# Patient Record
Sex: FEMALE | Race: BLACK OR AFRICAN AMERICAN | NOT HISPANIC OR LATINO | URBAN - METROPOLITAN AREA
[De-identification: names, ages, dates, MRNs, and addresses within clinical notes are randomized per-mention and may not be internally consistent; named-entity substitution may affect disease eponyms.]

---

## 2023-06-05 PROBLEM — Z00.00 ENCOUNTER FOR PREVENTIVE HEALTH EXAMINATION: Status: ACTIVE | Noted: 2023-06-05

## 2023-06-17 ENCOUNTER — EMERGENCY (EMERGENCY)
Facility: HOSPITAL | Age: 38
LOS: 1 days | Discharge: HOME | End: 2023-06-17
Attending: STUDENT IN AN ORGANIZED HEALTH CARE EDUCATION/TRAINING PROGRAM
Payer: COMMERCIAL

## 2023-06-17 VITALS
OXYGEN SATURATION: 99 % | DIASTOLIC BLOOD PRESSURE: 86 MMHG | RESPIRATION RATE: 18 BRPM | SYSTOLIC BLOOD PRESSURE: 121 MMHG | HEART RATE: 84 BPM | TEMPERATURE: 98 F

## 2023-06-17 VITALS
TEMPERATURE: 98 F | WEIGHT: 149.91 LBS | SYSTOLIC BLOOD PRESSURE: 137 MMHG | DIASTOLIC BLOOD PRESSURE: 92 MMHG | HEART RATE: 80 BPM | RESPIRATION RATE: 18 BRPM | OXYGEN SATURATION: 98 %

## 2023-06-17 PROCEDURE — 73090 X-RAY EXAM OF FOREARM: CPT | Mod: 26,RT

## 2023-06-17 PROCEDURE — 73110 X-RAY EXAM OF WRIST: CPT

## 2023-06-17 PROCEDURE — 99284 EMERGENCY DEPT VISIT MOD MDM: CPT

## 2023-06-17 PROCEDURE — 99284 EMERGENCY DEPT VISIT MOD MDM: CPT | Mod: 25

## 2023-06-17 PROCEDURE — 73110 X-RAY EXAM OF WRIST: CPT | Mod: 26,RT

## 2023-06-17 PROCEDURE — 73090 X-RAY EXAM OF FOREARM: CPT

## 2023-06-17 PROCEDURE — 73080 X-RAY EXAM OF ELBOW: CPT

## 2023-06-17 PROCEDURE — 73080 X-RAY EXAM OF ELBOW: CPT | Mod: 26,RT

## 2023-06-17 RX ORDER — ACETAMINOPHEN 500 MG
650 TABLET ORAL ONCE
Refills: 0 | Status: COMPLETED | OUTPATIENT
Start: 2023-06-17 | End: 2023-06-17

## 2023-06-17 RX ORDER — OXYCODONE HYDROCHLORIDE 5 MG/1
5 TABLET ORAL ONCE
Refills: 0 | Status: DISCONTINUED | OUTPATIENT
Start: 2023-06-17 | End: 2023-06-17

## 2023-06-17 RX ADMIN — Medication 650 MILLIGRAM(S): at 22:00

## 2023-06-17 RX ADMIN — OXYCODONE HYDROCHLORIDE 5 MILLIGRAM(S): 5 TABLET ORAL at 21:21

## 2023-06-17 RX ADMIN — OXYCODONE HYDROCHLORIDE 5 MILLIGRAM(S): 5 TABLET ORAL at 22:00

## 2023-06-17 RX ADMIN — Medication 650 MILLIGRAM(S): at 21:21

## 2023-06-17 NOTE — ED ADULT TRIAGE NOTE - CHIEF COMPLAINT QUOTE
while practicing martial arts pt hit R arm on a board and developed pain and swelling  to medial wrist/ forearm. difficulty pronating wrist.

## 2023-06-17 NOTE — ED PROVIDER NOTE - CLINICAL SUMMARY MEDICAL DECISION MAKING FREE TEXT BOX
37-year-old female presenting  with likely right forearm fracture.   neurovascularly intact.  We will get x-rays and pain control.

## 2023-06-17 NOTE — ED PROVIDER NOTE - CARE PROVIDER_API CALL
José Jama  Orthopaedic Surgery  [address], [address]  [place] [address]  Phone: [phone]  Fax: [phone]  Follow Up Time: 7-10 Days

## 2023-06-17 NOTE — ED PROVIDER NOTE - PROGRESS NOTE DETAILS
Shaquille Romo, PGY2 EXTR shows fracture.  Sugar-tong splint was placed.  Will give  for follow-up in a week.

## 2023-06-17 NOTE — ED ADULT NURSE NOTE - NSFALLUNIVINTERV_ED_ALL_ED
Bed/Stretcher in lowest position, wheels locked, appropriate side rails in place/Call bell, personal items and telephone in reach/Instruct patient to call for assistance before getting out of bed/chair/stretcher/Non-slip footwear applied when patient is off stretcher/[place] to call system/Physically safe environment - no spills, clutter or unnecessary equipment/Purposeful proactive rounding/Room/bathroom lighting operational, light cord in reach

## 2023-06-17 NOTE — ED PROVIDER NOTE - ATTENDING CONTRIBUTION TO CARE
I, Glenn Alexander, performed a history and physical exam of the patient and discussed their management with the resident and /or advanced care provider. I reviewed the resident and /or ACP's note and agree with the documented findings and plan of care. I was present and available for all procedures.    37-year-old female with no significant past medical history presents with right forearm pain status post trying to break a wooden board.  Patient was at karate trying to chop down a wooden board and unfortunately was unable to and instantly had pain.  The mid right forearm on the ulnar side.  Patient denies any lacerations to the skin denies any numbness or tingling of the hand or any weakness.  Patient does not take any blood thinning medications and otherwise denies any other additional trauma.  Patient endorses some pain at the elbow when pronating and supinating the hand.    On exam patient with some redness and swelling of the mid to distal ulna without any laceration of the skin but there is abrasion and redness appreciated with tenderness to palpation.  U/R/M nerves are intact, patient able to give an okay sign, peace sign and radial pulses 2+ and symmetric bilaterally.  Patient without any tenderness of the right humerus or elbow.  No hand tenderness to palpation.    Presentation concerning for possible ulnar fracture versus contusion patient ordered for x-ray and pain medication.

## 2023-06-17 NOTE — ED PROVIDER NOTE - PATIENT PORTAL LINK FT
You can access the FollowMyHealth Patient Portal offered by [facility] by registering at the following website: [email or web address]. By joining [facility]’s FollowMyHealth portal, you will also be able to view your health information using other applications (apps) compatible with our system.

## 2023-06-17 NOTE — ED PROVIDER NOTE - OBJECTIVE STATEMENT
37-year-old female no past medical history, no AC or aspirin presents to the ED with right  forearm pain and deformity after she was in a martial arts class and attempted to break a wood with the arm.  Denies any head trauma, no LOC.  Has some tenderness around the elbow as well.

## 2023-06-17 NOTE — ED PROVIDER NOTE - NSFOLLOWUPINSTRUCTIONS_ED_ALL_ED_FT
Please follow-up with Dr. gaspar  In his office in a week.  Please call the phone number that was provided to you to make an appointment.    As discussed, please keep the arm elevated when you sleep or sit down.  You may take 500-1000 mg acetaminophen every 6 hours, as needed for pain  You may take 600 mg ibuprofen every 8 hours, with food, as needed for pain    Please return to the hospital if you experience any new or worsening symptoms including inability to move your fingers, worsening pain in your fingers or your arm, or pale fingers

## 2023-06-17 NOTE — ED ADULT NURSE NOTE - OBJECTIVE STATEMENT
38 yo f c/o right arm pain s/p injury. PT states that she was practicing martial arts and breaking boards with her right arm and began feeling swelling and pain after hitting one today. No pertinent PMH. PT A,Ox4, ambulatory at baseline. Respirations even and unlabored, abd soft, nondistended and nontender, skin warm, dry and intact, unable to move right arm. Denies HA, CP, SOB, n/v/d, fever, chills and urinary symptoms.

## 2023-06-17 NOTE — ED PROVIDER NOTE - PHYSICAL EXAMINATION
Const: Well-nourished, Well-developed, appearing stated age.  Eyes: no conjunctival injection, and symmetrical lids.  HEENT: Head NCAT, no lesions. Atraumatic external nose and ears. Moist MM.  Neck: Symmetric, trachea midline.   RESP: Unlabored respiratory effort.   GI: Nontender/Nondistended, No CVA tenderness b/l.   MSK:   Right forearm deformity   With edema and TTP.  Pulses intact.  Full ROM and strength of of fingers.  TTP to elbow.  Skin: Warm, dry and intact.   Neuro: CNs II-XII grossly intact. Motor & Sensation grossly intact.  Psych: Awake, Alert, & Oriented (AAO) x3. Appropriate mood and affect.

## 2023-06-20 ENCOUNTER — APPOINTMENT (OUTPATIENT)
Dept: ORTHOPEDIC SURGERY | Facility: CLINIC | Age: 38
End: 2023-06-20
Payer: COMMERCIAL

## 2023-06-20 ENCOUNTER — NON-APPOINTMENT (OUTPATIENT)
Age: 38
End: 2023-06-20

## 2023-06-20 PROCEDURE — 73110 X-RAY EXAM OF WRIST: CPT | Mod: RT

## 2023-06-20 PROCEDURE — 29075 APPL CST ELBW FNGR SHORT ARM: CPT | Mod: RT

## 2023-06-20 PROCEDURE — 99203 OFFICE O/P NEW LOW 30 MIN: CPT | Mod: 25

## 2023-06-20 NOTE — END OF VISIT
[FreeTextEntry3] : All medical record entries made by the Scribe were at my,  Dr. José Jama MD., direction and personally dictated by me on 06/20/2023. I have personally reviewed the chart and agree that the record accurately reflects my personal performance of the history, physical exam, assessment and plan.

## 2023-06-20 NOTE — PHYSICAL EXAM
[de-identified] : Patient is WDWN, alert, and in no acute distress. Breathing is unlabored. She is grossly oriented to person, place, and time.\par \par She is accompanied by her  today.\par \par Right Wrist:\par She presents immobilized in a sling and short arm splint, which were removed for exam. \par After the splint was removed, no evidence of skin breakdown or lesions.\par There is moderate edema without ecchymosis to the wrist dorsally.\par Tenderness at the distal ulna, at the level of the fracture.\par ROM to the wrist was not accessed.\par Digital motion is full.\par Sensation is intact along the median nerve distribution, with slightly diminished sensation at the little finger, along the ulnar nerve distribution.  [de-identified] : AP, lateral and oblique views of the RIGHT wrist were obtained today and revealed an extra-articular distal ulnar fracture with mild displacement. \par \par ------------------------------------------------------------------------------------------------------------------------------------------------------------------------------------ \par \par EXAM: XR FOREARM 2 VIEWS RT\par EXAM: XR WRIST COMP MIN 3 VIEWS RT\par PROCEDURE DATE: 06/17/2023\par IMPRESSION:\par Acute extra-articular distal right ulnar fracture with mild anterior displacement of distal fracture fragment. No dislocation of the wrist. Moderate soft tissue swelling about the distal right forearm/wrist.\par \par MEHREEN LOVE MD; Resident Radiologist\par This document has been electronically signed.\par ROSANA KRISHNAN DO; Attending Radiologist\par This document has been electronically signed. Jun 18 2023 10:12AM\par \par ------------------------------------------------------------------------------------------------------------------------------------------------------------------------------------\par \par EXAM: XR ELBOW COMP MIN 3V RT\par PROCEDURE DATE: 06/17/2023\par IMPRESSION: \par No acute fracture or dislocation. No elbow joint effusion.\par \par MEHREEN LOVE MD; Resident Radiologist\par This document has been electronically signed.\par ROSANA KRISHNAN DO; Attending Radiologist\par This document has been electronically signed. Jun 18 2023 10:56AM

## 2023-06-20 NOTE — HISTORY OF PRESENT ILLNESS
[de-identified] : Pt is a RHD 36 y/o female with a right wrist injury x 3 days (DOI: 06/17/2023). She was training for martial arts and tried to perform a "board break." She immediately had pain and was seen at [facility] ED on 6/17/23 where xrays were obtained and revealed a distal ulnar fracture. She was splinted and advised to follow up with a specialist. \par \par She smokes cigarettes and estimates she smokes about half of a pack.

## 2023-06-20 NOTE — DISCUSSION/SUMMARY
[de-identified] : The underlying pathophysiology was reviewed with the patient. XR films were reviewed with the patient. Discussed at length the nature of the patient’s condition. The right wrist symptoms are secondary to a distal ulnar fracture. \par \par At this time, I reviewed her xrays with her in great detail. We discussed treatment options of operative and nonoperative management. I explained to her that while the fracture is not in perfect, it is overall reasonably aligned and unless she were to tell me otherwise, I would recommend treating this nonoperatively in a cast rather than with ORIF, at least initially. She is in agreement and has deferred ORIF in lieu of nonoperative management of cast immobilization.\par \par She was placed into a right short arm cast in the office today on 6/20/23.\par Proper cast care instructions were reviewed.\par I recommended ROM exercises of the digits, wrist, elbow and shoulder while casted.\par She was instructed to use the hand for all ADLs as tolerated to maintain motion and function, while casted.\par NSAIDs prn. \par \par Finally, she was instructed to discontinue smoking, at least while the fracture is healing. I discussed the side effects of Nicotine on bone health and healing in great detail. \par \par All questions answered, understanding verbalized. Patient in agreement with plan of care. Follow up in 2 weeks for repeat xrays in the cast.

## 2023-06-20 NOTE — ADDENDUM
[FreeTextEntry1] : I, Leah Connors wrote this note acting as a scribe for Dr. José Jama on Jun 20, 2023.

## 2023-07-06 ENCOUNTER — APPOINTMENT (OUTPATIENT)
Dept: ORTHOPEDIC SURGERY | Facility: CLINIC | Age: 38
End: 2023-07-06
Payer: COMMERCIAL

## 2023-07-06 PROCEDURE — 99213 OFFICE O/P EST LOW 20 MIN: CPT

## 2023-07-06 PROCEDURE — 73110 X-RAY EXAM OF WRIST: CPT | Mod: RT

## 2023-07-06 NOTE — ADDENDUM
[FreeTextEntry1] : I, Leah Connors wrote this note acting as a scribe for Dr. José Jama on Jul 06, 2023.

## 2023-07-06 NOTE — DISCUSSION/SUMMARY
[de-identified] : The underlying pathophysiology was reviewed with the patient. XR films were reviewed with the patient. Discussed at length the nature of the patient’s condition. The right wrist symptoms are secondary to a distal ulnar fracture. \par \par At this time, she will continue in the cast to complete the full 6 weeks of immobilization. I did tell her there is about a 90 percent chance of healing but I would recommend giving this the full 6 weeks to see if it does in fact heal, prior to possibly recommending ORIF in the event a nonunion results. She was instructed on activity modification. \par \par Finally, she was instructed to discontinue smoking, at least while the fracture is healing. I discussed the side effects of Nicotine on bone health and healing in great detail. \par \par All questions answered, understanding verbalized. Patient in agreement with plan of care. Follow up in 3 weeks for repeat xrays and cast removal.

## 2023-07-06 NOTE — PHYSICAL EXAM
[de-identified] : Patient is WDWN, alert, and in no acute distress. Breathing is unlabored. She is grossly oriented to person, place, and time.\par \par She is accompanied by her  today.\par \par Right Wrist:\par She presents immobilized in a short arm cast.\par The cast is fitting well, without issue.\par Digits are moving freely with brisk capillary refill.\par Sensation to the digits is intact distally.  [de-identified] : AP, lateral and oblique views of the RIGHT wrist were obtained today and revealed an extra-articular distal ulnar fracture with mild displacement. There is 2.8 mm of displacement seen on previous xrays from 6/20/23 as well as on today's xrays. \par \par ------------------------------------------------------------------------------------------------------------------------------------------------------------------------------------ \par \par EXAM: XR FOREARM 2 VIEWS RT\par EXAM: XR WRIST COMP MIN 3 VIEWS RT\par PROCEDURE DATE: 06/17/2023\par IMPRESSION:\par Acute extra-articular distal right ulnar fracture with mild anterior displacement of distal fracture fragment. No dislocation of the wrist. Moderate soft tissue swelling about the distal right forearm/wrist.\par \par MEHREEN LOVE MD; Resident Radiologist\par This document has been electronically signed.\par ROSANA KRISHNAN DO; Attending Radiologist\par This document has been electronically signed. Jun 18 2023 10:12AM\par \par ------------------------------------------------------------------------------------------------------------------------------------------------------------------------------------\par \par EXAM: XR ELBOW COMP MIN 3V RT\par PROCEDURE DATE: 06/17/2023\par IMPRESSION: \par No acute fracture or dislocation. No elbow joint effusion.\par \par MEHREEN LOVE MD; Resident Radiologist\par This document has been electronically signed.\par ROSANA KRISHNAN DO; Attending Radiologist\par This document has been electronically signed. Jun 18 2023 10:56AM

## 2023-07-06 NOTE — HISTORY OF PRESENT ILLNESS
[de-identified] : Pt is a RHD 38 y/o female with a right wrist injury. (DOI: 06/17/2023). She was training for martial arts and tried to perform a "board break." She immediately had pain and was seen at [facility] ED on 6/17/23 where xrays were obtained and revealed a distal ulnar fracture. She was splinted and advised to follow up with a specialist. She was initially seen in the office on 6/20/23 at which time she was casted secondary to the injury as stated above. She returns on 7/6/23 for repeat xrays in the cast. She does still note limitations of motion as well as pain with rotation. She is woken up at night due to the pain. She is complaining of increased pain currently than a few weeks ago as she has been going to [employer] classes with the cast in place and notes she twisted the wrist. \par \par She smokes cigarettes and estimates she smokes about half of a pack.

## 2023-07-06 NOTE — END OF VISIT
[FreeTextEntry3] : All medical record entries made by the Scribe were at my,  Dr. José Jama MD., direction and personally dictated by me on 07/06/2023. I have personally reviewed the chart and agree that the record accurately reflects my personal performance of the history, physical exam, assessment and plan.

## 2023-07-11 ENCOUNTER — APPOINTMENT (OUTPATIENT)
Dept: ORTHOPEDIC SURGERY | Facility: CLINIC | Age: 38
End: 2023-07-11

## 2023-08-03 ENCOUNTER — APPOINTMENT (OUTPATIENT)
Dept: ORTHOPEDIC SURGERY | Facility: CLINIC | Age: 38
End: 2023-08-03
Payer: COMMERCIAL

## 2023-08-03 VITALS — WEIGHT: 150 LBS | BODY MASS INDEX: 24.11 KG/M2 | HEIGHT: 66 IN

## 2023-08-03 PROCEDURE — 29075 APPL CST ELBW FNGR SHORT ARM: CPT | Mod: RT

## 2023-08-03 PROCEDURE — 73110 X-RAY EXAM OF WRIST: CPT | Mod: RT

## 2023-08-03 PROCEDURE — 99213 OFFICE O/P EST LOW 20 MIN: CPT | Mod: 25

## 2023-08-03 NOTE — ADDENDUM
[FreeTextEntry1] : I, Leah Connors wrote this note acting as a scribe for Dr. José Jama on Aug 03, 2023.

## 2023-08-03 NOTE — HISTORY OF PRESENT ILLNESS
[de-identified] : Pt is a RHD 38 y/o female with a right wrist injury. (DOI: 06/17/2023). She was training for martial arts and tried to perform a "board break." She immediately had pain and was seen at [facility] ED on 6/17/23 where xrays were obtained and revealed a distal ulnar fracture. She was splinted and advised to follow up with a specialist. She was initially seen in the office on 6/20/23 at which time she was casted secondary to the injury as stated above. She returns for repeat xrays in the cast on 8/3/23. She reports a great deal of increased pain in the cast at the fracture site, as of the past 1 week. She also states the cast is beginning to split. She still complains of limited supination with associated pain. She smokes cigarettes and estimates she smokes about 1 pack of cigarettes per week.

## 2023-08-03 NOTE — DISCUSSION/SUMMARY
[de-identified] : The underlying pathophysiology was reviewed with the patient. XR films were reviewed with the patient. Discussed at length the nature of the patient's condition. The right wrist symptoms are secondary to a distal ulnar fracture.   At this time, I discussed the xrays findings with her in great detail. I explained to her that at 5 weeks, it is concerning that there is minimal healing as well as calcium resorption noted at the fracture site. Additionally, her notable increase in pain as of the last 1 week is concerning as well. I did tell her that given she is a smoker; this is likely the main contributing factor as to why the fracture did not heal. I did tell her that taking everything into consideration that we discussed, as documented above, I would recommend ORIF. She stated she would like to give things two more weeks immobilized in a cast to see how she progresses and ultimately if at that time, there is a resultant nonunion, she will elect to proceed with ORIF. I did however explain to her that if she continues to smoke, the fracture likely will not heal, even after undergoing surgery. In the interim, she was placed back into a right short arm cast. Proper cast care instructions were reviewed. Finally, she was again instructed to discontinue smoking, at least while the fracture is healing. I discussed the side effects of Nicotine on bone health and healing in great detail.   All questions answered, understanding verbalized. Patient in agreement with plan of care. Follow up in 2 weeks for repeat xrays.

## 2023-08-03 NOTE — PHYSICAL EXAM
[de-identified] : Patient is WDWN, alert, and in no acute distress. Breathing is unlabored. She is grossly oriented to person, place, and time.  She is accompanied by her  today.  Right Wrist: She presents immobilized in a short arm cast, which was removed for exam After the cast was removed, no evidence of skin breakdown or lesions. She has focal tenderness along the distal ulna, in the region of the fracture. Supination of the forearm/wrist is limited with pain. Pronation is full. Digital motion is full.  Sensation to the digits is intact distally.  [de-identified] : AP, lateral and oblique views of the RIGHT wrist were obtained today and revealed an extra-articular distal ulnar fracture with mild displacement. There is increased evidence of displacement, with possible resorption of calcium noted at the fracture site.   ------------------------------------------------------------------------------------------------------------------------------------------------------------------------------------   EXAM: XR FOREARM 2 VIEWS RT EXAM: XR WRIST COMP MIN 3 VIEWS RT PROCEDURE DATE: 06/17/2023 IMPRESSION: Acute extra-articular distal right ulnar fracture with mild anterior displacement of distal fracture fragment. No dislocation of the wrist. Moderate soft tissue swelling about the distal right forearm/wrist.  MEHREEN LOVE MD; Resident Radiologist This document has been electronically signed. ROSANA KRISHNAN DO; Attending Radiologist This document has been electronically signed. Jun 18 2023 10:12AM  ------------------------------------------------------------------------------------------------------------------------------------------------------------------------------------  EXAM: XR ELBOW COMP MIN 3V RT PROCEDURE DATE: 06/17/2023 IMPRESSION:  No acute fracture or dislocation. No elbow joint effusion.  MEHREEN LOVE MD; Resident Radiologist This document has been electronically signed. ROSANA KRISHNAN DO; Attending Radiologist This document has been electronically signed. Jun 18 2023 10:56AM

## 2023-08-03 NOTE — END OF VISIT
[FreeTextEntry3] : All medical record entries made by the Scribe were at my,  Dr. José Jama MD., direction and personally dictated by me on 08/03/2023. I have personally reviewed the chart and agree that the record accurately reflects my personal performance of the history, physical exam, assessment and plan.

## 2023-08-08 ENCOUNTER — APPOINTMENT (OUTPATIENT)
Dept: ORTHOPEDIC SURGERY | Facility: CLINIC | Age: 38
End: 2023-08-08

## 2023-08-17 ENCOUNTER — APPOINTMENT (OUTPATIENT)
Dept: ORTHOPEDIC SURGERY | Facility: CLINIC | Age: 38
End: 2023-08-17
Payer: COMMERCIAL

## 2023-08-17 ENCOUNTER — OUTPATIENT (OUTPATIENT)
Dept: OUTPATIENT SERVICES | Facility: HOSPITAL | Age: 38
LOS: 1 days | End: 2023-08-17
Payer: COMMERCIAL

## 2023-08-17 VITALS
TEMPERATURE: 98 F | RESPIRATION RATE: 14 BRPM | OXYGEN SATURATION: 99 % | DIASTOLIC BLOOD PRESSURE: 80 MMHG | HEART RATE: 79 BPM | SYSTOLIC BLOOD PRESSURE: 130 MMHG | WEIGHT: 166.89 LBS | HEIGHT: 65 IN

## 2023-08-17 VITALS — HEIGHT: 66 IN | WEIGHT: 165 LBS | BODY MASS INDEX: 26.52 KG/M2

## 2023-08-17 DIAGNOSIS — Z01.818 ENCOUNTER FOR OTHER PREPROCEDURAL EXAMINATION: ICD-10-CM

## 2023-08-17 DIAGNOSIS — S52.609A UNSPECIFIED FRACTURE OF LOWER END OF UNSPECIFIED ULNA, INITIAL ENCOUNTER FOR CLOSED FRACTURE: ICD-10-CM

## 2023-08-17 DIAGNOSIS — S52.601A UNSPECIFIED FRACTURE OF LOWER END OF RIGHT ULNA, INITIAL ENCOUNTER FOR CLOSED FRACTURE: ICD-10-CM

## 2023-08-17 LAB
HCG SERPL-ACNC: <1 MIU/ML — SIGNIFICANT CHANGE UP
HCT VFR BLD CALC: 42.8 % — SIGNIFICANT CHANGE UP (ref 34.5–45)
HGB BLD-MCNC: 14 G/DL — SIGNIFICANT CHANGE UP (ref 11.5–15.5)
MCHC RBC-ENTMCNC: 27.6 PG — SIGNIFICANT CHANGE UP (ref 27–34)
MCHC RBC-ENTMCNC: 32.7 GM/DL — SIGNIFICANT CHANGE UP (ref 32–36)
MCV RBC AUTO: 84.4 FL — SIGNIFICANT CHANGE UP (ref 80–100)
NRBC # BLD: 0 /100 WBCS — SIGNIFICANT CHANGE UP (ref 0–0)
PLATELET # BLD AUTO: 309 K/UL — SIGNIFICANT CHANGE UP (ref 150–400)
RBC # BLD: 5.07 M/UL — SIGNIFICANT CHANGE UP (ref 3.8–5.2)
RBC # FLD: 15.5 % — HIGH (ref 10.3–14.5)
WBC # BLD: 8.17 K/UL — SIGNIFICANT CHANGE UP (ref 3.8–10.5)
WBC # FLD AUTO: 8.17 K/UL — SIGNIFICANT CHANGE UP (ref 3.8–10.5)

## 2023-08-17 PROCEDURE — G0463: CPT

## 2023-08-17 PROCEDURE — 85027 COMPLETE CBC AUTOMATED: CPT

## 2023-08-17 PROCEDURE — 99213 OFFICE O/P EST LOW 20 MIN: CPT

## 2023-08-17 PROCEDURE — 84702 CHORIONIC GONADOTROPIN TEST: CPT

## 2023-08-17 PROCEDURE — 36415 COLL VENOUS BLD VENIPUNCTURE: CPT

## 2023-08-17 PROCEDURE — 73110 X-RAY EXAM OF WRIST: CPT | Mod: RT

## 2023-08-17 NOTE — ADDENDUM
[FreeTextEntry1] : I, Leah Connors wrote this note acting as a scribe for Dr. José Jama on Aug 17, 2023.

## 2023-08-17 NOTE — H&P PST ADULT - NSICDXPROCEDURE_GEN_ALL_CORE_FT
PROCEDURES:  Arthroscopy-assisted open reduction and internal fixation (ORIF) of injury of wrist 17-Aug-2023 12:16:16 ORIF right distal ulnar fracture Anum Hernandes

## 2023-08-17 NOTE — PHYSICAL EXAM
[de-identified] : Patient is WDWN, alert, and in no acute distress. Breathing is unlabored. She is grossly oriented to person, place, and time.  She is accompanied by her  today.  Right Wrist: She presents immobilized in a short arm cast, which was removed for exam After the cast was removed, no evidence of skin breakdown or lesions. She has focal tenderness along the distal ulna, in the region of the fracture. Supination of the forearm/wrist is limited with pain. Pronation is full. Digital motion is full.  Sensation to the digits is intact distally.  [de-identified] : AP, lateral and oblique views of the RIGHT wrist were obtained today and revealed an extra-articular distal ulnar fracture with displacement. There are signs of interval healing noted.   ------------------------------------------------------------------------------------------------------------------------------------------------------------------------------------   EXAM: XR FOREARM 2 VIEWS RT EXAM: XR WRIST COMP MIN 3 VIEWS RT PROCEDURE DATE: 06/17/2023 IMPRESSION: Acute extra-articular distal right ulnar fracture with mild anterior displacement of distal fracture fragment. No dislocation of the wrist. Moderate soft tissue swelling about the distal right forearm/wrist.  MEHREEN LOVE MD; Resident Radiologist This document has been electronically signed. ROSANA KRISHNAN DO; Attending Radiologist This document has been electronically signed. Jun 18 2023 10:12AM  ------------------------------------------------------------------------------------------------------------------------------------------------------------------------------------  EXAM: XR ELBOW COMP MIN 3V RT PROCEDURE DATE: 06/17/2023 IMPRESSION:  No acute fracture or dislocation. No elbow joint effusion.  MEHREEN LOVE MD; Resident Radiologist This document has been electronically signed. ROSANA KRISHNAN DO; Attending Radiologist This document has been electronically signed. Jun 18 2023 10:56AM

## 2023-08-17 NOTE — HISTORY OF PRESENT ILLNESS
[de-identified] : Pt is a RHD 38 y/o female with a right wrist injury. (DOI: 06/17/2023). She was training for martial arts and tried to perform a "board break." She immediately had pain and was seen at [facility] ED on 6/17/23 where xrays were obtained and revealed a distal ulnar fracture. She was splinted and advised to follow up with a specialist. She was initially seen in the office on 6/20/23 at which time she was casted secondary to the injury as stated above. She returns for repeat xrays and cast removal on 8/17/23. She notes improvements in pain since she was last seen. She does however note continued pain and restrictions with supination. She smokes cigarettes and estimates she smokes about 1 pack of cigarettes per week.

## 2023-08-17 NOTE — H&P PST ADULT - NSICDXFAMILYHX_GEN_ALL_CORE_FT
FAMILY HISTORY:  Father  Still living? Yes, Estimated age: 61-70  FH: HTN (hypertension), Age at diagnosis: Age Unknown  FH: testicular cancer, Age at diagnosis: Age Unknown    Mother  Still living? Unknown  Family history of breast cancer in mother, Age at diagnosis: Age Unknown  FH: HTN (hypertension), Age at diagnosis: Age Unknown    Grandparent  Still living? No  Family history of breast cancer, Age at diagnosis: Age Unknown

## 2023-08-17 NOTE — END OF VISIT
[FreeTextEntry3] : All medical record entries made by the Scribe were at my,  Dr. José Jama MD., direction and personally dictated by me on 08/17/2023. I have personally reviewed the chart and agree that the record accurately reflects my personal performance of the history, physical exam, assessment and plan.

## 2023-08-17 NOTE — DISCUSSION/SUMMARY
[de-identified] : The underlying pathophysiology was reviewed with the patient. XR films were reviewed with the patient. Discussed at length the nature of the patient's condition. The right wrist symptoms are secondary to a distal ulnar fracture.   At this time, the right short arm cast was removed in the office today on 8/17/23. She was instructed to soak the hand in warm water and Epsom salts. I recommended range of motion exercises of the wrist and digits. She was instructed to begin to use the hand for all ADLs as tolerated. I recommended she begin a course of hand therapy. She will avoid contact sports and heavy lifting, as the fracture is not yet fully healed and she is at risk for displacing the fracture further, if she were to fall. No lifting greater than 10 pounds and no weight-bearing activities such as pushups. She was again instructed to discontinue smoking, at least while the fracture is healing. I discussed the side effects of Nicotine on bone health and healing in great detail.   The patient wishes to proceed with hand therapy of the right wrist and hand (ROM and strengthening). A script was given.  All questions answered, understanding verbalized. Patient in agreement with plan of care. Follow up in 4 weeks for repeat xrays.

## 2023-08-17 NOTE — H&P PST ADULT - HISTORY OF PRESENT ILLNESS
This is a 37 y/o female who sustained right ulnar fracture presents with right wrist pain swelling and pain with ROM . Patient states while she was training for martial arts , tried to' break a board injured  her wrist . x ray showed right ulnar fracture . scheduled for ORIF right distal ulnar fracture on 8/18/23

## 2023-08-18 ENCOUNTER — APPOINTMENT (OUTPATIENT)
Dept: ORTHOPEDIC SURGERY | Facility: HOSPITAL | Age: 38
End: 2023-08-18

## 2023-09-18 ENCOUNTER — APPOINTMENT (OUTPATIENT)
Dept: ORTHOPEDIC SURGERY | Facility: CLINIC | Age: 38
End: 2023-09-18
Payer: COMMERCIAL

## 2023-09-18 DIAGNOSIS — S52.601A UNSPECIFIED FRACTURE OF LOWER END OF RIGHT ULNA, INITIAL ENCOUNTER FOR CLOSED FRACTURE: ICD-10-CM

## 2023-09-18 PROBLEM — I89.0 LYMPHEDEMA, NOT ELSEWHERE CLASSIFIED: Chronic | Status: ACTIVE | Noted: 2023-08-17

## 2023-09-18 PROBLEM — G43.909 MIGRAINE, UNSPECIFIED, NOT INTRACTABLE, WITHOUT STATUS MIGRAINOSUS: Chronic | Status: ACTIVE | Noted: 2023-08-17

## 2023-09-18 PROCEDURE — 73110 X-RAY EXAM OF WRIST: CPT | Mod: RT

## 2023-09-18 PROCEDURE — 99213 OFFICE O/P EST LOW 20 MIN: CPT

## 2023-12-21 ENCOUNTER — APPOINTMENT (OUTPATIENT)
Dept: ORTHOPEDIC SURGERY | Facility: CLINIC | Age: 38
End: 2023-12-21